# Patient Record
(demographics unavailable — no encounter records)

---

## 2025-01-09 NOTE — PHYSICAL EXAM
[Ankle Swelling (On Exam)] : present [Ankle Swelling Bilaterally] : bilaterally  [Ankle Swelling On The Left] : moderate [Varicose Veins Of Lower Extremities] : bilaterally [] : on both lower extremities [Delayed in the Right Toes] : capillary refills delayed in the right toes [Delayed in the Left Toes] : capillary refills delayed in the left toes [0] : left foot dorsalis pedis 0 [Deep Tendon Reflexes (DTR)] : deep tendon reflexes were 2+ and symmetric [Motor Exam] : the motor exam was normal [Vibration Dec.] : diminished vibratory sensation at the level of the toes [Diminished Throughout Right Foot] : diminished sensation with monofilament testing throughout right foot [Diminished Throughout Left Foot] : diminished sensation with monofilament testing throughout left foot [General Appearance - Alert] : alert [Normal Foot/Ankle] : Both lower extremities were exposed and visualized. Standing exam demonstrates normal foot posture and alignment. Hindfoot exam shows no hindfoot valgus or varus [Oriented To Time, Place, And Person] : oriented to person, place, and time [de-identified] : Forefoot valgus, plantar flexed 1st ray.   [FreeTextEntry1] : Painful, thickened mycotic nails x10 with underlying debris causing pain and difficulty ambulating.  Thickness reaches approximately 4mm.  IPK, sub. 2, bilateral with pain.  \par   [Position Sense Dec.] : normal position sense at the level of the toes

## 2025-01-09 NOTE — ASSESSMENT
[FreeTextEntry1] : Impression: Onychomycosis, painful (B35.1).  IPK, painful (L85.1).  Difficulty walking (R26.1).  Peripheral vascular disease (173.9).  Atherosclerosis (I70.51).  Peripheral neuropathy (G62.9).  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  Treatment: The painful thickened mycotic nails were debrided and ground thin, both manually and mechanically.  IPK's were debrided and enucleated.  Dispersion padding applied.  Patient was given home care instructions.  Discussed appropriate shoe gear.   Any questions or problems, patient is to contact the office.

## 2025-01-09 NOTE — HISTORY OF PRESENT ILLNESS
[Sneakers] : dakota [FreeTextEntry1] : Patient presents today with painful, thickened mycotic nails that have underlying debris.  He also has painful IPK's, sub. 2, bilateral as well as peripheral vascular disease as well as venous stasis, bilateral.  There are no new changes to his medical status.

## 2025-01-09 NOTE — PHYSICAL EXAM
[Ankle Swelling (On Exam)] : present [Ankle Swelling Bilaterally] : bilaterally  [Ankle Swelling On The Left] : moderate [Varicose Veins Of Lower Extremities] : bilaterally [] : on both lower extremities [Delayed in the Right Toes] : capillary refills delayed in the right toes [Delayed in the Left Toes] : capillary refills delayed in the left toes [0] : left foot dorsalis pedis 0 [Deep Tendon Reflexes (DTR)] : deep tendon reflexes were 2+ and symmetric [Motor Exam] : the motor exam was normal [Vibration Dec.] : diminished vibratory sensation at the level of the toes [Diminished Throughout Right Foot] : diminished sensation with monofilament testing throughout right foot [Diminished Throughout Left Foot] : diminished sensation with monofilament testing throughout left foot [General Appearance - Alert] : alert [Normal Foot/Ankle] : Both lower extremities were exposed and visualized. Standing exam demonstrates normal foot posture and alignment. Hindfoot exam shows no hindfoot valgus or varus [Oriented To Time, Place, And Person] : oriented to person, place, and time [de-identified] : Forefoot valgus, plantar flexed 1st ray.   [FreeTextEntry1] : Painful, thickened mycotic nails x10 with underlying debris causing pain and difficulty ambulating.  Thickness reaches approximately 4mm.  IPK, sub. 2, bilateral with pain.  \par   [Position Sense Dec.] : normal position sense at the level of the toes

## 2025-05-13 NOTE — PHYSICAL EXAM
[Ankle Swelling (On Exam)] : present [Ankle Swelling Bilaterally] : bilaterally  [Ankle Swelling On The Left] : moderate [Varicose Veins Of Lower Extremities] : bilaterally [] : on both lower extremities [Delayed in the Right Toes] : capillary refills delayed in the right toes [Delayed in the Left Toes] : capillary refills delayed in the left toes [0] : left foot dorsalis pedis 0 [Deep Tendon Reflexes (DTR)] : deep tendon reflexes were 2+ and symmetric [Motor Exam] : the motor exam was normal [Vibration Dec.] : diminished vibratory sensation at the level of the toes [Diminished Throughout Right Foot] : diminished sensation with monofilament testing throughout right foot [Diminished Throughout Left Foot] : diminished sensation with monofilament testing throughout left foot [General Appearance - Alert] : alert [Normal Foot/Ankle] : Both lower extremities were exposed and visualized. Standing exam demonstrates normal foot posture and alignment. Hindfoot exam shows no hindfoot valgus or varus [de-identified] : Forefoot valgus, plantar flexed 1st ray.   [FreeTextEntry1] : Painful, thickened mycotic nails x10 with underlying debris causing pain and difficulty ambulating.  Thickness reaches approximately 4mm.  IPK, sub. 2, bilateral with pain.   Dry, scaly skin on both legs consistent with xerosis.    [Position Sense Dec.] : normal position sense at the level of the toes [Oriented To Time, Place, And Person] : oriented to person, place, and time

## 2025-05-13 NOTE — ASSESSMENT
[FreeTextEntry1] : Impression: Onychomycosis, painful (B35.1).  IPK, painful (L85.1).  Difficulty walking (R26.1).  Peripheral vascular disease (173.9).  Atherosclerosis (I70.51).  Peripheral neuropathy (G62.9).  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  Xerosis, bilateral (L85.3).  Treatment: The painful thickened mycotic nails were debrided and ground thin, both manually and mechanically.  IPK's were debrided and enucleated.  Dispersion padding applied.  Patient was given home care instructions.  Discussed appropriate shoe gear.   Patient was given Ammonium Lactate for the dry skin and explained proper usage.   Any questions or problems, patient is to contact the office.

## 2025-05-13 NOTE — PHYSICAL EXAM
[Ankle Swelling (On Exam)] : present [Ankle Swelling Bilaterally] : bilaterally  [Ankle Swelling On The Left] : moderate [Varicose Veins Of Lower Extremities] : bilaterally [] : on both lower extremities [Delayed in the Right Toes] : capillary refills delayed in the right toes [Delayed in the Left Toes] : capillary refills delayed in the left toes [0] : left foot dorsalis pedis 0 [Deep Tendon Reflexes (DTR)] : deep tendon reflexes were 2+ and symmetric [Motor Exam] : the motor exam was normal [Vibration Dec.] : diminished vibratory sensation at the level of the toes [Diminished Throughout Right Foot] : diminished sensation with monofilament testing throughout right foot [Diminished Throughout Left Foot] : diminished sensation with monofilament testing throughout left foot [General Appearance - Alert] : alert [Normal Foot/Ankle] : Both lower extremities were exposed and visualized. Standing exam demonstrates normal foot posture and alignment. Hindfoot exam shows no hindfoot valgus or varus [de-identified] : Forefoot valgus, plantar flexed 1st ray.   [FreeTextEntry1] : Painful, thickened mycotic nails x10 with underlying debris causing pain and difficulty ambulating.  Thickness reaches approximately 4mm.  IPK, sub. 2, bilateral with pain.   Dry, scaly skin on both legs consistent with xerosis.    [Position Sense Dec.] : normal position sense at the level of the toes [Oriented To Time, Place, And Person] : oriented to person, place, and time

## 2025-05-13 NOTE — HISTORY OF PRESENT ILLNESS
[Sneakers] : dakota [FreeTextEntry1] : Patient presents today with painful, thickened mycotic nails that have underlying debris.  He also has painful IPK's, sub. 2, bilateral as well as peripheral vascular disease as well as venous stasis, bilateral.  Patient also complains of dry skin of both legs.  There are no new changes to his medical status.

## 2025-07-01 NOTE — REVIEW OF SYSTEMS
[Feeling Poorly] : feeling poorly [Feeling Tired] : feeling tired [As noted in HPI] : as noted in HPI [Lower Ext Edema] : lower extremity edema [SOB on Exertion] : shortness of breath during exertion [As Noted in HPI] : as noted in HPI [Negative] : Heme/Lymph

## 2025-07-01 NOTE — DATA REVIEWED
[FreeTextEntry1] : 5/29/25 TTE LVEF  58%, AGGIE 0.87 sqcm, mGr 36.8mmHg, pGr 65.9mmHg, AoV 4.06 m/s, DVI 0.21  6/14/25 Cardiac CT - see scanned report for details  6/16/25 Cardiac catheterization: pCx 80%, PCI x1

## 2025-07-01 NOTE — ASSESSMENT
[FreeTextEntry1] : 76M with afib on eliquis, CVA, walks with a walker with severe symptomatic aortic stenosis.  Risks and benefits of TAVR explained to patient and wife.  They are amendable to proceeding.  All work up completed.  TAVR scheduled for 7/7.  All questions answered.

## 2025-07-01 NOTE — HISTORY OF PRESENT ILLNESS
[FreeTextEntry1] : Mr. Jean Baptiste is a 76 year old male with PMH of AFib on Eliquis, HTN, CAD, CVA in February 2025, referred today by Dr. Sanchez and Dr. Phillips for evaluation of aortic stenosis. He was hospitalized in February at [facility], his AS was in the moderate range at that time not warranting intervention. Repeat TTE done on 5/29 shows progression of AS to severe. He has completed his cardiac CT and Cardiac catheterization which showed a pCx stenosis of 80%, now s/p PCI on 6/23  He was admitted 1/27 after he became unable to stand, wife called 911 and was found to be COVID +, Found to have CVA, has had prior strokes in past as well. In hospital Evaled per ID and pt now s/p RDV. Decadron dc'd given no hypoxia. Evaled by neurology rec for CT/MRI. CTH w/ chronic changes. Carotid dopplers with no significant stenosis of either carotid artery along image segments as above. Antegrade flow noted of the right vertebral artery. MRI 2/3 shows small acute infarcts in the right frontal lobe, chronic infarct in left cerebellum; as per Neuro, low suspicion that AF is culprit given already on NOAC. Heme evaled. Suspect hypercoagulable state given Acute covid. Pt remains on eliquis. TTE no PFO ; severe AS. Evaled by structural cardiology and recommended for outpatient reassessment and workup. Pt w/ hx alcohol dependence, s/p CIWA.  He presents today with his wife.  Has not had a drink of ETOH since January.  Underwent TAVR CTA 6/14, SP cath 6/26 with PCI to LCX started on Plavix.  Scheduled for TAVR CT tomorrow. Walks with a walker for 14 years on and off due to BL hip replacement and right Knee replacement. He reports feeling fatigued and some SOB at times but is mainly sedentary at home since he most recent hospitalization. Denies CP.  He reports not feeling any better sp PCI. He endorses swelling to BL LE maintained on Lasix 40mg. Has had 9 teeth removed in the past few weeks in prep for TAVR.

## 2025-07-01 NOTE — PHYSICAL EXAM
[General Appearance - Alert] : alert [Sclera] : the sclera and conjunctiva were normal [Neck Appearance] : the appearance of the neck was normal [Jugular Venous Distention Increased] : there was no jugular-venous distention [] : no respiratory distress [Respiration, Rhythm And Depth] : normal respiratory rhythm and effort [Exaggerated Use Of Accessory Muscles For Inspiration] : no accessory muscle use [Auscultation Breath Sounds / Voice Sounds] : lungs were clear to auscultation bilaterally [Apical Impulse] : the apical impulse was normal [Heart Rate And Rhythm] : heart rate was normal and rhythm regular [Heart Sounds] : normal S1 and S2 [FreeTextEntry1] : 2/6 [Abdomen Soft] : soft [Abdomen Tenderness] : non-tender [Involuntary Movements] : no involuntary movements were seen [No Focal Deficits] : no focal deficits [Oriented To Time, Place, And Person] : oriented to person, place, and time [Impaired Insight] : insight and judgment were intact [Affect] : the affect was normal [Mood] : the mood was normal

## 2025-07-01 NOTE — END OF VISIT
[FreeTextEntry3] : Written by Sigifredo Lara NP, acting as a scribe for Dr. Garcia The documentation recorded by the scribe accurately reflects the service I personally performed and the decisions made by me. Signature Daisy Garcia MD.

## 2025-07-10 NOTE — ASSESSMENT
[FreeTextEntry1] : Pt recovering well at home s/p TAVR. Reviewed all medications and dosages with pt understanding. Pt has all medications in home and is taking as prescribed. Pain controlled with current medication regimen. No new symptoms, issues or concerns to report at this time. [facility] SOC 7/10.

## 2025-07-10 NOTE — HISTORY OF PRESENT ILLNESS
[Home] : at home, [unfilled] , at the time of the visit. [Other Location: e.g. Home (Enter Location, City,State)___] : at [unfilled] [Telehealth (audio & video)] : This visit was provided via telehealth using real-time 2-way audio visual technology. [FreeTextEntry1] : 76 year old male with PMH former smoker (1-2 PPD x 10 years; quit 46 years  ago), AF (on Eliquis), HTN, CAD, multiple CVAs (with last 2/2025 - no  neurologic deficits) and known aortic stenosis was admitted on 1/27/25 after he  became unable to stand, wife called 911 and was found to be COVID + and also  diagnosed with a CVA. He has had prior strokes in past as well. CTH w/ chronic  changes. Carotid dopplers with no significant stenosis of either carotid  artery. Antegrade flow noted of the right vertebral artery. MRI 2/3 showed  small acute infarcts in the right frontal lobe, chronic infarct in left  cerebellum. As per Neuro, low suspicion that AF is culprit given already on  NOAC. Heme evaluated and suspected hypercoagulable state given acute covid. Pt  remains on eliquis. TTE with no PFO; severe AS. He had a repeat TTE done on  5/29/25 that showed a progression of his AS to severe. He had a cardiac cath  performed which showed a pCx stenosis of 80% s/p PCI intervention (6/23/25 -  now on Plavix). He denies chest pain, dizziness, palpitations or syncopal  episodes. Pt now presents to [facility] for scheduled TAVR with Dr. Garcia on 7/7/25.    7/7 Underwent TAVR with Dr. Garcia. Post-TAVR with hypotension and bradycardia into  the 30s. Was brought to the CTU on Levo--weaned off without issue. Seen by EP.  7/8 TTE: LV nrml, trace PVL  7/9 no PPM per EP team, will be discharged with MCOT  7/10 Initial visit completed via [facility]  CC " I am doing ok"

## 2025-07-10 NOTE — PHYSICAL EXAM
[] : no respiratory distress [Exaggerated Use Of Accessory Muscles For Inspiration] : no accessory muscle use [FreeTextEntry1] : B/l groin CLAUDIO, clean, dry and intact. No erythema, hematoma or drainage noted.  [Oriented To Time, Place, And Person] : oriented to person, place, and time

## 2025-07-10 NOTE — PLAN
[TextEntry] : Post Operative Care:  Pt advised of importance of daily weights. Pt instructed on how to use incentive spirometer every hour, demonstrated proper use. Pt encouraged to ambulate as much as tolerated, avoiding extreme temperatures outdoors. Also advised to cleanse incisions daily with mild soap and water and to avoid lotions, powders, ointments or creams near or on the incision. Low salt, low fat diet encouraged and discussed. Pt advised to avoid heavy lifting or straining.     [facility] team will continue to follow up with pt status.  NP/CCC roles explained with pt understanding, contact information provided. Pt agrees to call with any questions, issues or concerns.  Worsening symptoms reviewed with patient understanding.      FOLLOW UP APPOINTMENTS:  CTS:  7/15  CARDIOLOGIST: pt. will call to schedule appointment to be seen within 2 weeks  PCP: Pt encouraged to follow up within one month of discharge

## 2025-07-15 NOTE — REASON FOR VISIT
[de-identified] : 76 year old male with PMH former smoker (1-2 PPD x 10 years; quit 46 years ago), AF (on Eliquis), HTN, CAD, multiple CVAs (with last 2/2025 - no neurologic deficits), CAD s/p PCI 6/23/25, s/p TAVR 7/7/2025 with post procedure bradycardia into the 30s. Seen by EP, no PPM, discharged with MCOT.  doing well since discharge.  no symptoms although has been having 3 sec pauses on MCOT   EKG reviewed with EPS, no indication for PPM at this time

## 2025-07-15 NOTE — ASSESSMENT
[FreeTextEntry1] : 76 year old male with PMH former smoker (1-2 PPD x 10 years; quit 46 years ago), AF (on Eliquis), HTN, CAD, multiple CVAs (with last 2/2025 - no neurologic deficits), CAD s/p PCI s/p TAVR 7/7 with 3 sec pauses on MCOT being followed by EPS  - continue current meds - f/u EPS - f/u cardiology with repeat TTE in 1 month - abx ppx disucssed - f/u prn

## 2025-07-15 NOTE — PHYSICAL EXAM
[] : no respiratory distress [Respiration, Rhythm And Depth] : normal respiratory rhythm and effort [Auscultation Breath Sounds / Voice Sounds] : lungs were clear to auscultation bilaterally [Apical Impulse] : the apical impulse was normal [Heart Rate And Rhythm] : heart rate was normal and rhythm regular [Heart Sounds] : normal S1 and S2 [Murmurs] : no murmurs [Clean] : clean [Dry] : dry [Healing Well] : healing well [Pitting Edema] : pitting edema present [___ +] : bilateral ankle [unfilled]U+ pitting edema [FreeTextEntry3] : B/L Groin, ecchymosis noted

## 2025-07-15 NOTE — REASON FOR VISIT
[de-identified] : 76 year old male with PMH former smoker (1-2 PPD x 10 years; quit 46 years ago), AF (on Eliquis), HTN, CAD, multiple CVAs (with last 2/2025 - no neurologic deficits), CAD s/p PCI 6/23/25, s/p TAVR 7/7/2025 with post procedure bradycardia into the 30s. Seen by EP, no PPM, discharged with MCOT.  doing well since discharge.  no symptoms although has been having 3 sec pauses on MCOT   EKG reviewed with EPS, no indication for PPM at this time

## 2025-07-16 NOTE — PHYSICAL EXAM
[General Appearance - Alert] : alert [Ankle Swelling (On Exam)] : present [Ankle Swelling Bilaterally] : bilaterally  [Ankle Swelling On The Left] : moderate [Varicose Veins Of Lower Extremities] : bilaterally [] : on both lower extremities [Delayed in the Right Toes] : capillary refills delayed in the right toes [Delayed in the Left Toes] : capillary refills delayed in the left toes [0] : left foot dorsalis pedis 0 [Normal Foot/Ankle] : Both lower extremities were exposed and visualized. Standing exam demonstrates normal foot posture and alignment. Hindfoot exam shows no hindfoot valgus or varus [Sensation] : the sensory exam was normal to light touch and pinprick [No Focal Deficits] : no focal deficits [Deep Tendon Reflexes (DTR)] : deep tendon reflexes were 2+ and symmetric [Motor Exam] : the motor exam was normal [Vibration Dec.] : diminished vibratory sensation at the level of the toes [Position Sense Dec.] : diminished position sense at the level of the toes [Diminished Throughout Right Foot] : diminished sensation with monofilament testing throughout right foot [Diminished Throughout Left Foot] : diminished sensation with monofilament testing throughout left foot [Oriented To Time, Place, And Person] : oriented to person, place, and time [FreeTextEntry3] : Temperature gradient: cooler distally. [de-identified] : Forefoot valgus, plantar flexed 1st ray.   [FreeTextEntry1] : Q8 - The patient has class findings of Q8 - Two Class B findings.

## 2025-07-16 NOTE — ASSESSMENT
[FreeTextEntry1] : Impression: Onychomycosis, painful (B35.1).  IPK, painful (L85.1).  Difficulty walking (R26.1).  Peripheral vascular disease (173.9).  Atherosclerosis (I70.51).  Peripheral neuropathy (G62.9).  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  Xerosis, bilateral (L85.3).  Treatment: The painful thickened mycotic nails were debrided and ground thin, both manually and mechanically.  IPK's were debrided and enucleated.  Dispersion padding applied.  Patient was given home care instructions.  Discussed appropriate shoe gear.   Patient is to continue the use of the Ammonium Lactate for the dry skin.   Any questions or problems, patient is to contact the office.

## 2025-07-16 NOTE — PHYSICAL EXAM
[General Appearance - Alert] : alert [Ankle Swelling (On Exam)] : present [Ankle Swelling Bilaterally] : bilaterally  [Ankle Swelling On The Left] : moderate [Varicose Veins Of Lower Extremities] : bilaterally [] : on both lower extremities [Delayed in the Right Toes] : capillary refills delayed in the right toes [Delayed in the Left Toes] : capillary refills delayed in the left toes [0] : left foot dorsalis pedis 0 [Normal Foot/Ankle] : Both lower extremities were exposed and visualized. Standing exam demonstrates normal foot posture and alignment. Hindfoot exam shows no hindfoot valgus or varus [Sensation] : the sensory exam was normal to light touch and pinprick [No Focal Deficits] : no focal deficits [Deep Tendon Reflexes (DTR)] : deep tendon reflexes were 2+ and symmetric [Motor Exam] : the motor exam was normal [Vibration Dec.] : diminished vibratory sensation at the level of the toes [Position Sense Dec.] : diminished position sense at the level of the toes [Diminished Throughout Right Foot] : diminished sensation with monofilament testing throughout right foot [Diminished Throughout Left Foot] : diminished sensation with monofilament testing throughout left foot [Oriented To Time, Place, And Person] : oriented to person, place, and time [FreeTextEntry3] : Temperature gradient: cooler distally. [de-identified] : Forefoot valgus, plantar flexed 1st ray.   [FreeTextEntry1] : Q8 - The patient has class findings of Q8 - Two Class B findings.